# Patient Record
Sex: FEMALE | Race: WHITE | NOT HISPANIC OR LATINO | URBAN - METROPOLITAN AREA
[De-identification: names, ages, dates, MRNs, and addresses within clinical notes are randomized per-mention and may not be internally consistent; named-entity substitution may affect disease eponyms.]

---

## 2023-03-17 ENCOUNTER — OFFICE VISIT (OUTPATIENT)
Dept: PULMONOLOGY | Facility: CLINIC | Age: 78
End: 2023-03-17
Payer: MEDICARE

## 2023-03-17 VITALS
WEIGHT: 178.81 LBS | SYSTOLIC BLOOD PRESSURE: 123 MMHG | HEART RATE: 66 BPM | HEIGHT: 62 IN | BODY MASS INDEX: 32.91 KG/M2 | DIASTOLIC BLOOD PRESSURE: 69 MMHG | OXYGEN SATURATION: 98 %

## 2023-03-17 DIAGNOSIS — R91.8 LUNG MASS: ICD-10-CM

## 2023-03-17 PROCEDURE — 1101F PT FALLS ASSESS-DOCD LE1/YR: CPT | Mod: CPTII,S$GLB,, | Performed by: INTERNAL MEDICINE

## 2023-03-17 PROCEDURE — 1159F MED LIST DOCD IN RCRD: CPT | Mod: CPTII,S$GLB,, | Performed by: INTERNAL MEDICINE

## 2023-03-17 PROCEDURE — 99203 OFFICE O/P NEW LOW 30 MIN: CPT | Mod: S$GLB,,, | Performed by: INTERNAL MEDICINE

## 2023-03-17 PROCEDURE — 3078F PR MOST RECENT DIASTOLIC BLOOD PRESSURE < 80 MM HG: ICD-10-PCS | Mod: CPTII,S$GLB,, | Performed by: INTERNAL MEDICINE

## 2023-03-17 PROCEDURE — 3074F PR MOST RECENT SYSTOLIC BLOOD PRESSURE < 130 MM HG: ICD-10-PCS | Mod: CPTII,S$GLB,, | Performed by: INTERNAL MEDICINE

## 2023-03-17 PROCEDURE — 99999 PR PBB SHADOW E&M-EST. PATIENT-LVL IV: ICD-10-PCS | Mod: PBBFAC,,, | Performed by: INTERNAL MEDICINE

## 2023-03-17 PROCEDURE — 1126F AMNT PAIN NOTED NONE PRSNT: CPT | Mod: CPTII,S$GLB,, | Performed by: INTERNAL MEDICINE

## 2023-03-17 PROCEDURE — 3288F PR FALLS RISK ASSESSMENT DOCUMENTED: ICD-10-PCS | Mod: CPTII,S$GLB,, | Performed by: INTERNAL MEDICINE

## 2023-03-17 PROCEDURE — 1159F PR MEDICATION LIST DOCUMENTED IN MEDICAL RECORD: ICD-10-PCS | Mod: CPTII,S$GLB,, | Performed by: INTERNAL MEDICINE

## 2023-03-17 PROCEDURE — 99203 PR OFFICE/OUTPT VISIT, NEW, LEVL III, 30-44 MIN: ICD-10-PCS | Mod: S$GLB,,, | Performed by: INTERNAL MEDICINE

## 2023-03-17 PROCEDURE — 3074F SYST BP LT 130 MM HG: CPT | Mod: CPTII,S$GLB,, | Performed by: INTERNAL MEDICINE

## 2023-03-17 PROCEDURE — 3078F DIAST BP <80 MM HG: CPT | Mod: CPTII,S$GLB,, | Performed by: INTERNAL MEDICINE

## 2023-03-17 PROCEDURE — 3288F FALL RISK ASSESSMENT DOCD: CPT | Mod: CPTII,S$GLB,, | Performed by: INTERNAL MEDICINE

## 2023-03-17 PROCEDURE — 99999 PR PBB SHADOW E&M-EST. PATIENT-LVL IV: CPT | Mod: PBBFAC,,, | Performed by: INTERNAL MEDICINE

## 2023-03-17 PROCEDURE — 1101F PR PT FALLS ASSESS DOC 0-1 FALLS W/OUT INJ PAST YR: ICD-10-PCS | Mod: CPTII,S$GLB,, | Performed by: INTERNAL MEDICINE

## 2023-03-17 PROCEDURE — 1126F PR PAIN SEVERITY QUANTIFIED, NO PAIN PRESENT: ICD-10-PCS | Mod: CPTII,S$GLB,, | Performed by: INTERNAL MEDICINE

## 2023-03-17 RX ORDER — OXYBUTYNIN CHLORIDE 10 MG/1
TABLET, EXTENDED RELEASE ORAL
COMMUNITY
Start: 2023-01-16

## 2023-03-17 RX ORDER — PROPAFENONE HYDROCHLORIDE 150 MG/1
150 TABLET, COATED ORAL EVERY 8 HOURS
COMMUNITY
End: 2024-03-21

## 2023-03-17 RX ORDER — LOSARTAN POTASSIUM 50 MG/1
TABLET ORAL
COMMUNITY
Start: 2023-02-03

## 2023-03-17 RX ORDER — VITAMIN B COMPLEX
TABLET ORAL
COMMUNITY

## 2023-03-17 RX ORDER — COLESTIPOL HYDROCHLORIDE 1 G/1
2 TABLET ORAL 2 TIMES DAILY PRN
COMMUNITY
Start: 2023-01-12

## 2023-03-17 RX ORDER — ACETAMINOPHEN 500 MG
5000 TABLET ORAL
COMMUNITY

## 2023-03-17 RX ORDER — MEMANTINE HYDROCHLORIDE 10 MG/1
TABLET ORAL
COMMUNITY
Start: 2023-01-17

## 2023-03-17 RX ORDER — LOPERAMIDE HYDROCHLORIDE 2 MG/1
2 CAPSULE ORAL
COMMUNITY

## 2023-03-17 RX ORDER — LATANOPROST 50 UG/ML
SOLUTION/ DROPS OPHTHALMIC
COMMUNITY
Start: 2023-01-16

## 2023-03-17 RX ORDER — CITALOPRAM 20 MG/1
TABLET, FILM COATED ORAL
COMMUNITY
Start: 2023-02-02

## 2023-03-17 RX ORDER — RIVAROXABAN 20 MG/1
TABLET, FILM COATED ORAL
COMMUNITY
Start: 2023-01-19

## 2023-03-17 RX ORDER — AMLODIPINE BESYLATE 5 MG/1
TABLET ORAL
COMMUNITY
Start: 2023-02-01

## 2023-03-17 RX ORDER — LEVOTHYROXINE SODIUM 125 UG/1
TABLET ORAL
COMMUNITY
Start: 2023-01-16

## 2023-03-17 RX ORDER — DIPHENOXYLATE HYDROCHLORIDE AND ATROPINE SULFATE 2.5; .025 MG/1; MG/1
1 TABLET ORAL 4 TIMES DAILY PRN
COMMUNITY

## 2023-03-17 RX ORDER — ALBUTEROL SULFATE 90 UG/1
AEROSOL, METERED RESPIRATORY (INHALATION)
COMMUNITY
Start: 2023-01-12 | End: 2024-03-21

## 2023-03-17 RX ORDER — FUROSEMIDE 80 MG/1
80 TABLET ORAL
COMMUNITY
Start: 2023-01-30

## 2023-03-17 RX ORDER — MULTIVIT WITH IRON,MINERALS
TABLET ORAL
COMMUNITY

## 2023-03-17 NOTE — PROGRESS NOTES
"Latanya Ramírez  was seen as a new patient for the evaluation of  abnormal CT chest.    CHIEF COMPLAINT:  Abnormal Ct Scan      HISTORY OF PRESENT ILLNESS: Latanya Ramírez is a 77 y.o. female  has a past medical history of Heart failure, unspecified, Hypertension, and Thyroid disease.    Patient recently had cxr at [facility] by Dr. Michael.  CXR mentioned 4 cm mass like opacity.  Subsequently, patient underwent CT chest at [facility].  CT images were not available.  Dictation mentioned "4 cm pleural based round soft tissue inferiorly in the left hemithorax."  Patient was referred to pulmonary for further inputs.      Patient is a lifelong nonsmoker.  No coughing.  No hemoptysis.  No weight loss.  No hematemesis.  No hemachozia.  Patient lives her daughter.  Active with adl.  Mother with breast cancer.  No family h/o lung cancer.      PAST MEDICAL HISTORY:    Active Ambulatory Problems     Diagnosis Date Noted    Lung mass 03/19/2023     Resolved Ambulatory Problems     Diagnosis Date Noted    No Resolved Ambulatory Problems     Past Medical History:   Diagnosis Date    Heart failure, unspecified     Hypertension     Thyroid disease                 PAST SURGICAL HISTORY:    Past Surgical History:   Procedure Laterality Date    CARPAL TUNNEL RELEASE Bilateral     EYE SURGERY      GALLBLADDER SURGERY      GASTRIC BYPASS      pace maker      RECTAL VAGINAL FISTULECTOMY           FAMILY HISTORY:                History reviewed. No pertinent family history.    SOCIAL HISTORY:          Tobacco:   Social History     Tobacco Use   Smoking Status Former    Types: Cigarettes   Smokeless Tobacco Former   Tobacco Comments    Stopped 35 years ago     alcohol use:    Social History     Substance and Sexual Activity   Alcohol Use None               Occupation:  former RN    ALLERGIES:    Review of patient's allergies indicates:   Allergen Reactions    Sulfamethoxazole-trimethoprim Hives    Suture, silk Other (See Comments)     SILK " "SUTURES-"they get infected"         CURRENT MEDICATIONS:    Current Outpatient Medications   Medication Sig Dispense Refill    albuterol (PROVENTIL/VENTOLIN HFA) 90 mcg/actuation inhaler SMARTSI Puff(s) By Mouth Every 4-6 Hours      amLODIPine (NORVASC) 5 MG tablet Take by mouth.      CALCIUM-MAGNESIUM-ZINC ORAL Take 1 tablet by mouth 3 (three) times daily.      cholecalciferol, vitamin D3, 125 mcg (5,000 unit) Tab Take 5,000 Units by mouth.      citalopram (CELEXA) 20 MG tablet Take by mouth.      COLESTID 1 gram Tab Take 2 g by mouth 2 (two) times daily as needed.      cyanocobalamin, vitamin B-12, (VITAMIN B-12) 2,500 mcg Subl Place under the tongue.      diphenoxylate-atropine 2.5-0.025 mg (LOMOTIL) 2.5-0.025 mg per tablet Take 1 tablet by mouth 4 (four) times daily as needed for Diarrhea.      furosemide (LASIX) 80 MG tablet Take 80 mg by mouth.      iron,carbonyl-vitamin C 65 mg iron- 125 mg TbEC Take 1 tablet by mouth once daily.      latanoprost 0.005 % ophthalmic solution       levothyroxine (SYNTHROID) 125 MCG tablet Take by mouth.      loperamide (IMODIUM) 2 mg capsule Take 2 mg by mouth.      losartan (COZAAR) 50 MG tablet Take by mouth.      memantine (NAMENDA) 10 MG Tab Take by mouth.      oxybutynin (DITROPAN-XL) 10 MG 24 hr tablet Take by mouth.      potassium gluconate 2.5 mEq Tab Take by mouth.      propafenone (RHTHYMOL) 150 MG Tab Take 150 mg by mouth every 8 (eight) hours.      XARELTO 20 mg Tab Take by mouth.       No current facility-administered medications for this visit.                  REVIEW OF SYSTEMS:     Pulmonary related symptoms as per HPI.  Gen:  no weight loss, no fever, no night sweat  HEENT:  no visual changes, no sore throat, no hearing loss  CV:  No chest pain, no orthopnea, no PND  GI:  no melena, no hematochezia, no diarhea, no constipation.  :  no dysuria, no hematuria, no hesistancy, no dribbling  Neuro:  no syncope, no vertigo, no tinitus  Psych:  No homocide or " "suicide ideation; no depression.  Endocrine:  No heat or cold intolerance.  Sleep:  + snoring; no witnessed apnea.  Feeling rested upon awake.    Otherwise, a balance of systems reviewed is negative.          PHYSICAL EXAM:  Vitals:    03/17/23 1412   BP: 123/69   Pulse: 66   SpO2: 98%   Weight: 81.1 kg (178 lb 12.7 oz)   Height: 5' 2" (1.575 m)   PainSc: 0-No pain     Body mass index is 32.7 kg/m².     GENERAL:  well develop; no apparent distress  HEENT:  no nasal congestion; no discharge noted; class 4 modified mallampatti.   NECK:  supple; no palpable masses.  CARDIO: regular rate and rhythm  PULM:  clear to auscultation bilaterally; no intercostals retractions; no accessory muscle usage   ABDOMEN:  soft nontender/nondistended.  +bowel sound  EXTREMITIES no cce  NEURO:  CN II-XII intact.  5/5 motor in all extremities.  sensation grossly intact   to light touch.  PSYCH:  normal affect.  Alert and oriented x 4    LABS  Pulmonary Functions Testing Results(personally reviewed):  none  ABG (personally reviewed):  none  CXR (personally reviewed):  none  CT CHEST(from [facility] and only dictation available 8/14/2017):  "There is a 4.3 x 4.2 x 4.1 cm mass which had appears to be within the   mediastinum and abuts the distal esophageal wall on the left.  The mass   may be arising from the left lateral distal esophageal wall.  The   lesion is well circumscribed without evidence of invasion into the   adjacent lung or left lateral esophageal wall.  There is apparent   macroscopic fat throughout the lesion with some soft tissue component.   Postsurgical changes are seen at the gastroesophageal junction from   prior gastric bypass.  There is a small hiatal hernia.  The esophagus   is patulous without additional lesion. "    ASSESSMENT/PLAN  Problem List Items Addressed This Visit       Lung mass    Overview     -4 cm mass in left hemithorax documented from DIS dictation 1/24/23  -reviewed of record mentioned " 4 cm left hemithorax tracing back to 10/16/13.    -will need to obtain ct images to verify that the lesion is chronic.  If lesion is present and stable since 2013, then further work up is not indicted.  D/w patient and she expressed understanding.                Patient will No follow-ups on file.

## 2023-03-19 PROBLEM — R91.8 LUNG MASS: Status: ACTIVE | Noted: 2023-03-19

## 2024-03-21 ENCOUNTER — OFFICE VISIT (OUTPATIENT)
Dept: PULMONOLOGY | Facility: CLINIC | Age: 79
End: 2024-03-21
Payer: MEDICARE

## 2024-03-21 VITALS
OXYGEN SATURATION: 97 % | SYSTOLIC BLOOD PRESSURE: 134 MMHG | HEART RATE: 60 BPM | WEIGHT: 184.31 LBS | DIASTOLIC BLOOD PRESSURE: 79 MMHG | HEIGHT: 62 IN | BODY MASS INDEX: 33.92 KG/M2

## 2024-03-21 DIAGNOSIS — R91.8 LUNG MASS: ICD-10-CM

## 2024-03-21 DIAGNOSIS — E66.01 MORBID OBESITY: Primary | ICD-10-CM

## 2024-03-21 PROCEDURE — 3078F DIAST BP <80 MM HG: CPT | Mod: CPTII,S$GLB,, | Performed by: INTERNAL MEDICINE

## 2024-03-21 PROCEDURE — 3075F SYST BP GE 130 - 139MM HG: CPT | Mod: CPTII,S$GLB,, | Performed by: INTERNAL MEDICINE

## 2024-03-21 PROCEDURE — 99214 OFFICE O/P EST MOD 30 MIN: CPT | Mod: S$GLB,,, | Performed by: INTERNAL MEDICINE

## 2024-03-21 PROCEDURE — 1126F AMNT PAIN NOTED NONE PRSNT: CPT | Mod: CPTII,S$GLB,, | Performed by: INTERNAL MEDICINE

## 2024-03-21 PROCEDURE — 99999 PR PBB SHADOW E&M-EST. PATIENT-LVL IV: CPT | Mod: PBBFAC,,, | Performed by: INTERNAL MEDICINE

## 2024-03-21 PROCEDURE — 1101F PT FALLS ASSESS-DOCD LE1/YR: CPT | Mod: CPTII,S$GLB,, | Performed by: INTERNAL MEDICINE

## 2024-03-21 PROCEDURE — 1159F MED LIST DOCD IN RCRD: CPT | Mod: CPTII,S$GLB,, | Performed by: INTERNAL MEDICINE

## 2024-03-21 PROCEDURE — 3288F FALL RISK ASSESSMENT DOCD: CPT | Mod: CPTII,S$GLB,, | Performed by: INTERNAL MEDICINE

## 2024-03-21 NOTE — PROGRESS NOTES
"Latanya Ramírez  was seen as a follow up.      CHIEF COMPLAINT:  Follow-up      HISTORY OF PRESENT ILLNESS: Latanya Ramírez is a 78 y.o. female  has a past medical history of Heart failure, unspecified, Hypertension, and Thyroid disease.    Patient recently had cxr at [facility] by Dr. Michael.  CXR mentioned 4 cm mass like opacity.  Subsequently, patient underwent CT chest at [facility].  Patient was referred to pulmonary for further inputs.  Our first encounter was 3/17/23.  At that time, CT images were not available.  Dictation mentioned "4 cm pleural based round soft tissue inferiorly in the left hemithorax."  Upon further reviewed, dictation available via [facility] mentioned left lung based opacity dating back to 10/16/13.  From our first encounter, I've requested CT images from outside facilities to confirmed stability.  Patient is here with copies of ct images from     No issue since last visit.   No coughing.  No hemoptysis.  No weight loss.  No hematemesis.  No hemachozia.  Patient lives her daughter.  Active with adl.  Mother with breast cancer.  No family h/o lung cancer.  Main complaint is left lateral rib pain a/w coughing.     Additional Pulmonary History:   Occupational/Environmental Exposures:  retire RN from [employer]  Exposure to Animals/Pets:  cat  Foreign Travel History:  denied  History of exposures to TB:  denied   Family History of Lung Cancer:  denied   Tobacco:  Patient is a lifelong nonsmoker.  Childhood history of Lung Disease:  denied  Chest surgery or trauma:  denied     PAST MEDICAL HISTORY:    Active Ambulatory Problems     Diagnosis Date Noted    Lung mass 03/19/2023    Morbid obesity 03/21/2024     Resolved Ambulatory Problems     Diagnosis Date Noted    No Resolved Ambulatory Problems     Past Medical History:   Diagnosis Date    Heart failure, unspecified     Hypertension     Thyroid disease                 PAST SURGICAL HISTORY:    Past Surgical History:   Procedure Laterality Date    " "CARPAL TUNNEL RELEASE Bilateral     EYE SURGERY      GALLBLADDER SURGERY      GASTRIC BYPASS      pace maker      RECTAL VAGINAL FISTULECTOMY           FAMILY HISTORY:                History reviewed. No pertinent family history.    SOCIAL HISTORY:          Tobacco:   Social History     Tobacco Use   Smoking Status Former    Types: Cigarettes   Smokeless Tobacco Former   Tobacco Comments    Stopped 35 years ago     alcohol use:    Social History     Substance and Sexual Activity   Alcohol Use None               Occupation:  former RN    ALLERGIES:    Review of patient's allergies indicates:   Allergen Reactions    Sulfamethoxazole-trimethoprim Hives    Suture, silk Other (See Comments)     SILK SUTURES-"they get infected"         CURRENT MEDICATIONS:    Current Outpatient Medications   Medication Sig Dispense Refill    amLODIPine (NORVASC) 5 MG tablet Take by mouth.      CALCIUM-MAGNESIUM-ZINC ORAL Take 1 tablet by mouth 3 (three) times daily.      cholecalciferol, vitamin D3, 125 mcg (5,000 unit) Tab Take 5,000 Units by mouth.      citalopram (CELEXA) 20 MG tablet Take by mouth.      COLESTID 1 gram Tab Take 2 g by mouth 2 (two) times daily as needed.      cyanocobalamin, vitamin B-12, (VITAMIN B-12) 2,500 mcg Subl Place under the tongue.      diphenoxylate-atropine 2.5-0.025 mg (LOMOTIL) 2.5-0.025 mg per tablet Take 1 tablet by mouth 4 (four) times daily as needed for Diarrhea.      furosemide (LASIX) 80 MG tablet Take 80 mg by mouth.      iron,carbonyl-vitamin C 65 mg iron- 125 mg TbEC Take 1 tablet by mouth once daily.      latanoprost 0.005 % ophthalmic solution       levothyroxine (SYNTHROID) 125 MCG tablet Take by mouth.      loperamide (IMODIUM) 2 mg capsule Take 2 mg by mouth.      losartan (COZAAR) 50 MG tablet Take by mouth.      memantine (NAMENDA) 10 MG Tab Take by mouth.      oxybutynin (DITROPAN-XL) 10 MG 24 hr tablet Take by mouth.      potassium gluconate 2.5 mEq Tab Take by mouth.      XARELTO 20 " "mg Tab Take by mouth.      albuterol (PROVENTIL/VENTOLIN HFA) 90 mcg/actuation inhaler SMARTSI Puff(s) By Mouth Every 4-6 Hours      propafenone (RHTHYMOL) 150 MG Tab Take 150 mg by mouth every 8 (eight) hours.       No current facility-administered medications for this visit.                  REVIEW OF SYSTEMS:     Pulmonary related symptoms as per HPI.  Gen:  no weight loss, no fever, no night sweat  HEENT:  no visual changes, no sore throat, no hearing loss  CV:  No chest pain, no orthopnea, no PND  GI:  no melena, no hematochezia, no diarhea, no constipation.  :  no dysuria, no hematuria, no hesistancy, no dribbling  Neuro:  no syncope, no vertigo, no tinitus  Psych:  No homocide or suicide ideation; no depression.  Endocrine:  No heat or cold intolerance.  Sleep:  + snoring; no witnessed apnea.  Feeling rested upon awake.    Otherwise, a balance of systems reviewed is negative.          PHYSICAL EXAM:  Vitals:    24 0756   BP: 134/79   Pulse: 60   SpO2: 97%   Weight: 83.6 kg (184 lb 4.9 oz)   Height: 5' 2" (1.575 m)   PainSc: 0-No pain       Body mass index is 33.71 kg/m².     GENERAL:  well develop; no apparent distress  HEENT:  no nasal congestion; no discharge noted; class 4 modified mallampatti.   NECK:  supple; no palpable masses.  CARDIO: regular rate and rhythm  PULM:  clear to auscultation bilaterally; no intercostals retractions; no accessory muscle usage.  TTP at left lateral rib  ABDOMEN:  soft nontender/nondistended.  +bowel sound  EXTREMITIES no cce  NEURO:  CN II-XII intact.  5/5 motor in all extremities.  sensation grossly intact   to light touch.  PSYCH:  normal affect.  Alert and oriented x 4    LABS  Pulmonary Functions Testing Results(personally reviewed):  none  ABG (personally reviewed):  none  CXR (personally reviewed):  none  CT CHEST(Personally reviewed) 17 from [facility]:  stable 4.8 cm left lower lobe adjacent to mediastinum with scattered fatty " density c/w harmatoma.  There is a small hiatal hernia.  the left lower lung mass is unchanged when compared to prior images 10/16/23    ASSESSMENT/PLAN  Problem List Items Addressed This Visit       Lung mass    Overview     4 cm mass in left lower lobe.  This lesion has been stable tracing back to 10/16/13.  Scattered fatty density suggestive of harmatomas.  Stability since 2013 c/w benign etiology.  D/w patient and she expressed understanding.           Morbid obesity - Primary    Overview     S/p gastric bypass with 90 lbs                Patient will No follow-ups on file.     30 minutes of total time spent on the encounter, which includes face to face time and non-face to face time preparing to see the patient (eg, review of tests), Obtaining and/or reviewing separately obtained history, documenting clinical information in the electronic or other health record, independently interpreting results (not separately reported) and communicating results to the patient/family/caregiver, or Care coordination (not separately reported).

## 2024-03-26 DIAGNOSIS — R91.8 LUNG MASS: Primary | ICD-10-CM
